# Patient Record
Sex: FEMALE | Race: WHITE | NOT HISPANIC OR LATINO | URBAN - METROPOLITAN AREA
[De-identification: names, ages, dates, MRNs, and addresses within clinical notes are randomized per-mention and may not be internally consistent; named-entity substitution may affect disease eponyms.]

---

## 2024-09-20 ENCOUNTER — TELEPHONE (OUTPATIENT)
Dept: NEUROPSYCHOLOGY | Facility: CLINIC | Age: 29
End: 2024-09-20
Payer: COMMERCIAL

## 2024-09-20 ENCOUNTER — TRANSCRIBE ORDERS (OUTPATIENT)
Dept: OTHER | Age: 29
End: 2024-09-20

## 2024-09-20 DIAGNOSIS — F84.0 AUTISTIC SPECTRUM DISORDER: Primary | ICD-10-CM

## 2024-09-20 NOTE — CONFIDENTIAL NOTE
Neuropsychology referral declined- referring provider notified via fax.   Thank you for your referral to the [facility] clinic. Unfortunately, our clinic does not see patients for diagnostic questions related to learning disabilities, ADHD, and/or autism spectrum disorders. In short, we do not currently perform assessments solely for the retrospective diagnosis of developmental conditions in adults.     Community neuropsychologists and psychologists include:    - [facility] - with locations throughout the [facility] area: [phone].    - [facility] - with locations throughout the [facility] area: [phone].    - [facility] - [phone], [email or web address]    - [facility] - [phone], [email or web address]    -[facility], [facility]- [phone]  [email or web address]     Other resources for patients:    - [email or web address] - you can search for providers in your community who offer ADHD assessments.    - Contact your insurance company for additional options.    Faby Mayers   Psychometrist

## 2024-11-12 ENCOUNTER — TRANSFERRED RECORDS (OUTPATIENT)
Dept: HEALTH INFORMATION MANAGEMENT | Facility: CLINIC | Age: 29
End: 2024-11-12

## 2024-11-13 ENCOUNTER — TRANSCRIBE ORDERS (OUTPATIENT)
Dept: OTHER | Age: 29
End: 2024-11-13

## 2024-11-13 DIAGNOSIS — G43.909 MIGRAINES: Primary | ICD-10-CM

## 2024-11-14 ENCOUNTER — MEDICAL CORRESPONDENCE (OUTPATIENT)
Dept: HEALTH INFORMATION MANAGEMENT | Facility: CLINIC | Age: 29
End: 2024-11-14
Payer: COMMERCIAL

## 2024-11-15 ENCOUNTER — TELEPHONE (OUTPATIENT)
Dept: OPHTHALMOLOGY | Facility: CLINIC | Age: 29
End: 2024-11-15
Payer: COMMERCIAL

## 2024-11-15 NOTE — TELEPHONE ENCOUNTER
[facility] Call Center    Phone Message    May a detailed message be left on voicemail: yes     Reason for Call: Appointment Intake    Referring Provider Name: Colin Galo OD   Diagnosis and/or Symptoms: pt describes her migraines as an optical illusion, a pattern that moved.  Patient states she has had concussion. Referred to Dr. Brito.      Action Taken: Message routed to:  [facility] Ophthalmology    Travel Screening: Not Applicable     Date of Service:

## 2024-11-18 NOTE — TELEPHONE ENCOUNTER
HISTORY OF CONCUSSION  [facility] neuro clinic is probably best for this one if able. David mora to schedule in a new neuro     Rand Grissom Communication Facilitator on 11/18/2024 at 11:08 AM

## 2025-01-07 ENCOUNTER — OFFICE VISIT (OUTPATIENT)
Dept: OPHTHALMOLOGY | Facility: CLINIC | Age: 30
End: 2025-01-07
Attending: OPTOMETRIST
Payer: COMMERCIAL

## 2025-01-07 DIAGNOSIS — G43.109 OCULAR MIGRAINE: Primary | ICD-10-CM

## 2025-01-07 PROCEDURE — G0463 HOSPITAL OUTPT CLINIC VISIT: HCPCS

## 2025-01-07 RX ORDER — SUMATRIPTAN SUCCINATE 100 MG/1
100 TABLET ORAL
COMMUNITY
Start: 2023-12-01

## 2025-01-07 RX ORDER — ONDANSETRON 4 MG/1
4 TABLET, FILM COATED ORAL EVERY 6 HOURS PRN
COMMUNITY
Start: 2024-09-30 | End: 2025-09-30

## 2025-01-07 RX ORDER — HYDROXYZINE HYDROCHLORIDE 25 MG/1
25 TABLET, FILM COATED ORAL EVERY 6 HOURS PRN
COMMUNITY
Start: 2024-07-05 | End: 2025-07-05

## 2025-01-07 ASSESSMENT — EXTERNAL EXAM - RIGHT EYE: OD_EXAM: NORMAL

## 2025-01-07 ASSESSMENT — CUP TO DISC RATIO
OS_RATIO: 0.4
OD_RATIO: 0.4

## 2025-01-07 ASSESSMENT — REFRACTION_WEARINGRX
OS_SPHERE: -4.00
OD_CYLINDER: +0.75
OD_AXIS: 089
OS_AXIS: 108
OS_CYLINDER: +0.75
SPECS_TYPE: SVL
OD_SPHERE: -3.75

## 2025-01-07 ASSESSMENT — CONF VISUAL FIELD
OS_INFERIOR_TEMPORAL_RESTRICTION: 0
OD_INFERIOR_NASAL_RESTRICTION: 0
OS_SUPERIOR_NASAL_RESTRICTION: 0
OD_NORMAL: 1
OD_SUPERIOR_NASAL_RESTRICTION: 0
OS_SUPERIOR_TEMPORAL_RESTRICTION: 0
OD_SUPERIOR_TEMPORAL_RESTRICTION: 0
OS_INFERIOR_NASAL_RESTRICTION: 0
OS_NORMAL: 1
METHOD: COUNTING FINGERS
OD_INFERIOR_TEMPORAL_RESTRICTION: 0

## 2025-01-07 ASSESSMENT — VISUAL ACUITY
OS_CC: 20/20
CORRECTION_TYPE: GLASSES
OD_CC: 20/20
METHOD: SNELLEN - LINEAR

## 2025-01-07 ASSESSMENT — TONOMETRY
IOP_METHOD: ICARE
OS_IOP_MMHG: 17
OD_IOP_MMHG: 20

## 2025-01-07 ASSESSMENT — EXTERNAL EXAM - LEFT EYE: OS_EXAM: NORMAL

## 2025-01-07 ASSESSMENT — SLIT LAMP EXAM - LIDS
COMMENTS: NORMAL
COMMENTS: NORMAL

## 2025-01-07 NOTE — NURSING NOTE
Chief Complaints and History of Present Illnesses   Patient presents with    Consult For     Visual disturbance consult.     Chief Complaint(s) and History of Present Illness(es)       Consult For              Laterality: both eyes    Associated symptoms: headache, photophobia and floaters.  Negative for glare, haloes, double vision, dryness, eye pain, tearing and flashes    Treatments tried: no treatments    Pain scale: 0/10    Comments: Visual disturbance consult.              Comments    Pt saw a visual disturbance on Nov 8th 2024.  The top and right sides of vision was covered in a pattern RE>LE that was moving.  The rest of her vision appeared blurred.  This lasted for about 40 minutes followed by 30 minutes before second occurrence that lasted about 15-20 minutes.  Pt has not seen this since but has been having pressure feeling behind RE>LE with sharp eye pain and headaches.  Pt also reports a possible floater once but not reoccurring.    No ocular meds.  No DM.    CONSTANZA Garcia January 7, 2025 2:06 PM

## 2025-01-07 NOTE — PROGRESS NOTES
HPI:    Patient reports an episodic visual disturbance on 11/8/24.  She had a jagged, black pattern in the upper right quadrant of BOTH eyes that persisted with eyes closed.  Episode lasted about 40 minutes, then subsided for 30 minutes before recurring for 15 minutes.  She had a headache that day.  She had noticed an intermittent pressure sensation behind the right>left eye.    Patient has a significant migraine headache history.    Assessment and Plan:  It is my impression that Rona experienced an ocular migraine.  I reassured her and her family of a normal structural eye exam.  I advised her to monitor for potential triggers should she experience recurrence.  We also discussed managing her light sensitivity with tints like FL-41 or Avulux.  At this time, she may continue with routine eye care.  I am happy to see her in the future with any concerns/changes, but I did not schedule a follow-up today.    Complete documentation of historical and exam elements from today's encounter can be found in the full encounter summary report (not reduplicated in this progress note). I personally obtained the chief complaint(s) and history of present illness. I confirmed and edited as necessary the review of systems, past medical/surgical history, family history, social history, and examination findings as document by others; and I examined the patient myself. I personally reviewed the relevant tests, images, and reports as documented above. I formulated and edited as necessary the assessment and plan and discussed the findings and management plan with the patient and family.    Myra Carlos, CARMEN